# Patient Record
(demographics unavailable — no encounter records)

---

## 2025-01-21 NOTE — PHYSICAL EXAM
[Well Developed] : well developed [No Acute Distress] : no acute distress [Obese] : obese [Normal Conjunctiva] : normal conjunctiva [Normal Venous Pressure] : normal venous pressure [Carotid Bruit] : carotid bruit [Normal S1, S2] : normal S1, S2 [No Rub] : no rub [No Gallop] : no gallop [Murmur] : murmur [Clear Lung Fields] : clear lung fields [Good Air Entry] : good air entry [No Respiratory Distress] : no respiratory distress  [Soft] : abdomen soft [Non Tender] : non-tender [No Masses/organomegaly] : no masses/organomegaly [Normal Gait] : normal gait [No Edema] : no edema [No Cyanosis] : no cyanosis [No Clubbing] : no clubbing [No Rash] : no rash [No Skin Lesions] : no skin lesions [Moves all extremities] : moves all extremities [No Focal Deficits] : no focal deficits [Normal Speech] : normal speech [Alert and Oriented] : alert and oriented [Normal memory] : normal memory [Appears Anxious] : appears anxious [de-identified] : Left [de-identified] : Grade II/VI systolic murmur

## 2025-01-21 NOTE — REVIEW OF SYSTEMS
[Chest Discomfort] : chest discomfort [Palpitations] : palpitations [Depression] : depression [Anxiety] : anxiety [Under Stress] : under stress [Negative] : Heme/Lymph [FreeTextEntry5] : Occasional dizziness

## 2025-01-21 NOTE — HISTORY OF PRESENT ILLNESS
[FreeTextEntry1] : 1-week MCOT (01/07 to 01/13/2025):  Presenting rhythm was NSR with average heart rate 87 bpm (Max 120, Min 51).  There were NO arrhythmias such as AFib, VT, SVT, pauses, or AV blocks.  There were occasional PVCs with only 3% burden.  1-short burst of VT vs. SVT.    Blood work from PCP (January 9, 2025):  Free T4 low at (0.77) with normal TSH at (1.74).    Carotid Doppler (December 4, 2024):  There is moderate to severe atherosclerotic stenosis in LICA (50-69%) and mild to moderate stenosis in ARABELLA (20-49%).  The left and right vertebral arteries demonstrate antegrade flow.    Transthoracic Echocardiogram (December 4, 2024):  Abnormal septal motion consistent with LBBB pattern; LV systolic function mildly decreased with LVEF 45 to 50%.  Mild LVH.  Left atrium mildly dilated.  Trileaflet aortic valve with mild AS.  Mild to moderate AR.  Mild to moderate MR.  Trace TR.  Trace pericardial effusion noted.

## 2025-01-21 NOTE — REASON FOR VISIT
[FreeTextEntry1] : The patient is a 74-year-old woman, who happens to be the wife of another mutual patient, Vern Aldridge.  She came to our office last month for a cardiac consultation regarding an increase of intermittent "dizziness" which she describes as an imbalance-like feeling that can come on suddenly without warning.  Sometimes, she states, she gets associated fluttering in the chest.  Fortunately, there's been no associated symptoms such as diaphoresis, SOB, MALIK, orthopnea, PND, syncope, edema.  After further questioning, it appears the patient has mainly noticed these symptoms during times of high stress/anxiety, and she's also noticed a "squeezing"-type sensation in her chest during these highly stressful times as well.   Curiously, it appears the patient did not experience any of her symptoms during the 7 days of wearing the MCOT device, and she admits to feeling palpitations /dizziness less than 24 hours of taking off the monitoring device.  Otherwise, she reports noticing her symptoms daily with multiple occasions throughout the day.

## 2025-01-21 NOTE — ASSESSMENT
[FreeTextEntry1] : EKG demonstrates sinus rhythm, rate 90 bpm, left bundle branch block with left axis, left atrial enlargement pattern, nonspecific ST elevation.    In summary, 74-year-old woman, who happens to be the wife of another mutual patient, Vern Aldridge.  She came to our office last month for a cardiac consultation regarding an increase of intermittent "dizziness" which she describes as an imbalance-like feeling that can come on suddenly without warning.  Sometimes, she states, she gets associated fluttering in the chest.  Fortunately, there's been no associated symptoms such as diaphoresis, SOB, MALIK, orthopnea, PND, syncope, edema.  After further questioning, it appears the patient has mainly noticed these symptoms during times of high stress/anxiety, and she's also noticed a "squeezing"-type sensation in her chest during these highly stressful times as well.   Curiously, it appears the patient did not experience any of her symptoms during the 7 days of wearing the MCOT device, and she admits to feeling palpitations /dizziness less than 24 hours of taking off the monitoring device.  Otherwise, she reports noticing her symptoms daily with multiple occasions throughout the day.    1-week MCOT (01/07 to 01/13/2025):  Presenting rhythm was NSR with average heart rate 87 bpm (Max 120, Min 51).  There were NO arrhythmias such as AFib, VT, SVT, pauses, or AV blocks.  There were occasional PVCs with only 3% burden.  1-short burst of VT vs. SVT.    Blood work from PCP (January 9, 2025):  Free T4 low at (0.77) with normal TSH at (1.74).    Carotid Doppler (December 4, 2024):  There is moderate to severe atherosclerotic stenosis in LICA (50-69%) and mild to moderate stenosis in ARABELLA (20-49%).  The left and right vertebral arteries demonstrate antegrade flow.    Transthoracic Echocardiogram (December 4, 2024):  Abnormal septal motion consistent with LBBB pattern; LV systolic function mildly decreased with LVEF 45 to 50%.  Mild LVH.  Left atrium mildly dilated.  Trileaflet aortic valve with mild AS.  Mild to moderate AR.  Mild to moderate MR.  Trace TR.  Trace pericardial effusion noted.    PLAN:  1).  Patient reassured her cardiac testing modalities have so far been fairly unremarkable, with the exception of showing a mildly reduced LV systolic cardiac function and some at least moderate to possibly severe carotid plaquing stenosis.    Will recommend she undergo a Pharmacologic Nuclear Stress Test and a Carotid MRA test within the near future to further delineate coronary perfusion to rule out CAD and to further assess carotid perfusion to rule out severe carotid stensosis respectively.    2).  Follow up with PCP regarding routine checkups and fasting blood work including lipid panel.  Forward all testing/lab work to our office.   Discuss possibly modifying anti-anxiety meds /anti-depression meds if needed in near future.    3).  Diet and lifestyle modification discussed including low sodium, low fat and low carbohydrate weight reducing diet.  Patient is to implement aerobic exercise regimen few days per week pending results from NST.   4).  Recommend patient report any untoward symptoms.   5).  Follow up with our office within 5-6 weeks or PRN.

## 2025-01-21 NOTE — PHYSICAL EXAM
[Well Developed] : well developed [No Acute Distress] : no acute distress [Obese] : obese [Normal Conjunctiva] : normal conjunctiva [Normal Venous Pressure] : normal venous pressure [Carotid Bruit] : carotid bruit [Normal S1, S2] : normal S1, S2 [No Rub] : no rub [No Gallop] : no gallop [Murmur] : murmur [Clear Lung Fields] : clear lung fields [Good Air Entry] : good air entry [No Respiratory Distress] : no respiratory distress  [Soft] : abdomen soft [Non Tender] : non-tender [No Masses/organomegaly] : no masses/organomegaly [Normal Gait] : normal gait [No Edema] : no edema [No Cyanosis] : no cyanosis [No Clubbing] : no clubbing [No Rash] : no rash [No Skin Lesions] : no skin lesions [Moves all extremities] : moves all extremities [No Focal Deficits] : no focal deficits [Normal Speech] : normal speech [Alert and Oriented] : alert and oriented [Normal memory] : normal memory [Appears Anxious] : appears anxious [de-identified] : Left [de-identified] : Grade II/VI systolic murmur

## 2025-03-14 NOTE — PHYSICAL EXAM
[Well Developed] : well developed [No Acute Distress] : no acute distress [Obese] : obese [Normal Conjunctiva] : normal conjunctiva [Normal Venous Pressure] : normal venous pressure [Carotid Bruit] : carotid bruit [Normal S1, S2] : normal S1, S2 [No Rub] : no rub [No Gallop] : no gallop [Murmur] : murmur [Clear Lung Fields] : clear lung fields [Good Air Entry] : good air entry [No Respiratory Distress] : no respiratory distress  [Soft] : abdomen soft [Non Tender] : non-tender [No Masses/organomegaly] : no masses/organomegaly [Normal Gait] : normal gait [No Edema] : no edema [No Cyanosis] : no cyanosis [No Clubbing] : no clubbing [No Rash] : no rash [No Skin Lesions] : no skin lesions [Moves all extremities] : moves all extremities [No Focal Deficits] : no focal deficits [Normal Speech] : normal speech [Alert and Oriented] : alert and oriented [Normal memory] : normal memory [Appears Anxious] : appears anxious [de-identified] : Left [de-identified] : Grade II/VI systolic murmur

## 2025-03-14 NOTE — HISTORY OF PRESENT ILLNESS
[FreeTextEntry1] : She reports her PCP recently prescribed her Crestor 5 mg nightly after reviewing he most recent labs showing total cholesterol 290 and .  Patient has not yet started this Statin medication but will be in the near future.  Repeat labs scheduled for later in May with her PCP.    Heart rates and blood pressure once again slightly elevated;  Currently on no prescription antihypertensive meds.    Patient now believes her dizziness "lightheadedness" may be contributed to all of her anti-anxiety/anti-depression meds.   1-week MCOT (01/07 to 01/13/2025):  Presenting rhythm was NSR with average heart rate 87 bpm (Max 120, Min 51).  There were NO arrhythmias such as AFib, VT, SVT, pauses, or AV blocks.  There were occasional PVCs with only 3% burden.  1-short burst of VT vs. SVT.    Blood work from PCP (January 9, 2025):  Glucose 99, HgA1C 5.2%, Creatinine 0.97, Cholesterol 290, , HDL 52, Triglycerides 134. Free T4 low at (0.77) with normal TSH at (1.74).    Carotid Doppler (December 4, 2024):  There is moderate to severe atherosclerotic stenosis in LICA (50-69%) and mild to moderate stenosis in ARABELLA (20-49%).  The left and right vertebral arteries demonstrate antegrade flow.    Transthoracic Echocardiogram (December 4, 2024):  Abnormal septal motion consistent with LBBB pattern; LV systolic function mildly decreased with LVEF 45 to 50%.  Mild LVH.  Left atrium mildly dilated.  Trileaflet aortic valve with mild AS.  Mild to moderate AR.  Mild to moderate MR.  Trace TR.  Trace pericardial effusion noted.    MRA of carotids (January 27, 2025):  Only shows 50% stenosis bilaterally in both ARABELLA and LICA.  The vertebral arteries demonstrate antegrade flow.

## 2025-03-14 NOTE — REASON FOR VISIT
[FreeTextEntry1] : The patient is a 74-year-old woman, who happens to be the wife of another mutual patient, Vern Aldridge.  She came to our office last December for a cardiac consultation regarding an increase of intermittent "dizziness" which she describes as an imbalance-like feeling that can come on suddenly without warning, and usually occurs while at rest and without change in position.  Sometimes, she states, she gets associated fluttering in the chest.  Fortunately, there's been no associated symptoms such as diaphoresis, SOB, MALIK, orthopnea, PND, syncope, edema.  After further discussion, she has not been hydrating as much as she thought she'd been hydrating.  She normally drinks a couple small bottles of water daily and she thought that was enough.  Still smoking cigarettes daily. It appears the patient has mainly noticed these symptoms during times of high stress/anxiety, and she's also noticed a "squeezing"-type sensation in her chest during these highly stressful times as well.  We recommended she undergo an ischemic workup considering her concerning symptoms along with having multiple risk factors for CAD.  However, patient was unable to stop smoking cigarettes 24 hours prior to NST, so, her ischemic workup has thus far NOT been done.     Curiously, it appears the patient did not experience any of her symptoms during the 7 days of wearing the MCOT device, and she admits to feeling palpitations /dizziness less than 24 hours of taking off the monitoring device.  Otherwise, she reports noticing her symptoms daily with multiple occasions throughout the day.

## 2025-03-14 NOTE — ASSESSMENT
[FreeTextEntry1] : EKG demonstrates sinus rhythm, rate 86 bpm, left bundle branch block with left axis, left atrial enlargement pattern, nonspecific ST elevation.    In summary, 74-year-old woman, who happens to be the wife of another mutual patient, Vern Aldridge.  She came to our office last month for a cardiac consultation regarding an increase of intermittent "dizziness" which she describes as an imbalance-like feeling that can come on suddenly without warning.  Sometimes, she states, she gets associated fluttering in the chest.  Fortunately, there's been no associated symptoms such as diaphoresis, SOB, MALIK, orthopnea, PND, syncope, edema.  After further discussion, she has not been hydrating as much as she thought she'd been hydrating.  She normally drinks a couple small bottles of water daily and she thought that was enough.  Still smoking cigarettes daily. It appears the patient has mainly noticed these symptoms during times of high stress/anxiety, and she's also noticed a "squeezing"-type sensation in her chest during these highly stressful times as well.  We recommended she undergo an ischemic workup considering her concerning symptoms along with having multiple risk factors for CAD.  However, patient was unable to stop smoking cigarettes 24 hours prior to NST, so, her ischemic workup has thus far NOT been done.     Curiously, it appears the patient did not experience any of her symptoms during the 7 days of wearing the MCOT device, and she admits to feeling palpitations /dizziness less than 24 hours of taking off the monitoring device.  Otherwise, she reports noticing her symptoms daily with multiple occasions throughout the day.    She reports her PCP recently prescribed her Crestor 5 mg nightly after reviewing he most recent labs showing total cholesterol 290 and .  Patient has not yet started this Statin medication but will be in the near future.  Repeat labs scheduled for later in May with her PCP.    Heart rates and blood pressure once again slightly elevated;  Currently on no prescription antihypertensive meds.    Patient now believes her dizziness "lightheadedness" may be contributed to all of her anti-anxiety/anti-depression meds.  1-week MCOT (01/07 to 01/13/2025):  Presenting rhythm was NSR with average heart rate 87 bpm (Max 120, Min 51).  There were NO arrhythmias such as AFib, VT, SVT, pauses, or AV blocks.  There were occasional PVCs with only 3% burden.  1-short burst of VT vs. SVT.    Blood work from PCP (January 9, 2025):  Free T4 low at (0.77) with normal TSH at (1.74).    Carotid Doppler (December 4, 2024):  There is moderate to severe atherosclerotic stenosis in LICA (50-69%) and mild to moderate stenosis in ARABELLA (20-49%).  The left and right vertebral arteries demonstrate antegrade flow.    MRA of carotids (January 27, 2025):  Only shows 50% stenosis bilaterally in both ARABELLA and LICA.  The vertebral arteries demonstrate antegrade flow.    Transthoracic Echocardiogram (December 4, 2024):  Abnormal septal motion consistent with LBBB pattern; LV systolic function mildly decreased with LVEF 45 to 50%.  Mild LVH.  Left atrium mildly dilated.  Trileaflet aortic valve with mild AS.  Mild to moderate AR.  Mild to moderate MR.  Trace TR.  Trace pericardial effusion noted.    PLAN:  1).  Considering patient's most recent echocardiogram showing a decrease in LV systolic function (LVEF 45 to 50%), with a LBBB pattern on EKG, with multiple risk factors including current everyday smoker and moderate/severe peripheral vascular disease, we recommend she undergo a cardiac CTA to further assess coronary perfusion to rule out obstructive CAD.    Begin taking OTC aspirin 81 mg daily.    We will empirically start patient on a beta blocker due to slightly elevated blood pressures and heart rates that appear to average in the high 80s to low 90s bpm.    Agree with starting Crestor 5 mg nightly recently prescribed by patients PCP.  Repeat fasting labs in approximately 3 months.    Counseled patient on the importance of smoking cessation.   2).  Follow up with PCP regarding routine checkups and fasting blood work including lipid panel.  Forward all testing/lab work to our office.   Discuss possibly modifying anti-anxiety meds /anti-depression meds if needed in near future.    3).  Diet and lifestyle modification discussed including low sodium, low fat and low carbohydrate weight reducing diet.  Patient is to implement aerobic exercise regimen few days per week pending results from cardiac CTA.    4).  Recommend patient report any untoward symptoms.   5).  Follow up with our office within 4 to 6 weeks or PRN.

## 2025-05-02 NOTE — PHYSICAL EXAM
[No Acute Distress] : no acute distress [Normal Conjunctiva] : normal conjunctiva [Normal S1, S2] : normal S1, S2 [Clear Lung Fields] : clear lung fields [Good Air Entry] : good air entry [No Respiratory Distress] : no respiratory distress  [Soft] : abdomen soft [Normal Gait] : normal gait [No Edema] : no edema [Moves all extremities] : moves all extremities [Alert and Oriented] : alert and oriented

## 2025-05-08 NOTE — HISTORY OF PRESENT ILLNESS
[FreeTextEntry1] : 75 yo female with a PMHx significant for HTN, HLD, Anxiety, b/l carotid atherosclerosis, LBBB and active tobacco user who presents to the office for the first time. She follows Dr. Rondon for Cardiology. She was in [facility] last 4/24-25 for scheduled cardiac catheterization after recent CCTA revealed RCA with focal noncalcified plaque resulting in severe luminal narrowing. He's s/p PCI with 1 CHRIS to mRCA (MANOD FRONTIER 3.5x26mm) for 80% stenosis last 4/24/2025 via RRA with Dr. Wright.  Today, he reports of feeling good. She has not smoked cigarettes since the PCI. RRA access site is benign: nontender, no lump, with palpable radial pulse distal to the access site. Denies chest pain, shortness of breath, palpitations, syncope or near syncope, orthopnea and PND. Compliant with medications.

## 2025-05-08 NOTE — CARDIOLOGY SUMMARY
[de-identified] : 5/2/2025: Sinus Rhythm  -Left bundle branch block and left axis. -Left atrial enlargement.

## 2025-05-08 NOTE — HISTORY OF PRESENT ILLNESS
[FreeTextEntry1] : 73 yo female with a PMHx significant for HTN, HLD, Anxiety, b/l carotid atherosclerosis, LBBB and active tobacco user who presents to the office for the first time. She follows Dr. Rondon for Cardiology. She was in [facility] last 4/24-25 for scheduled cardiac catheterization after recent CCTA revealed RCA with focal noncalcified plaque resulting in severe luminal narrowing. He's s/p PCI with 1 CHRIS to mRCA (MANDO FRONTIER 3.5x26mm) for 80% stenosis last 4/24/2025 via RRA with Dr. Wright.  Today, he reports of feeling good. She has not smoked cigarettes since the PCI. RRA access site is benign: nontender, no lump, with palpable radial pulse distal to the access site. Denies chest pain, shortness of breath, palpitations, syncope or near syncope, orthopnea and PND. Compliant with medications.

## 2025-05-08 NOTE — CARDIOLOGY SUMMARY
[de-identified] : 5/2/2025: Sinus Rhythm  -Left bundle branch block and left axis. -Left atrial enlargement.

## 2025-05-08 NOTE — DISCUSSION/SUMMARY
[Patient] : the patient [Risks] : risks [Benefits] : benefits [Alternatives] : alternatives [FreeTextEntry1] : 75 yo female with a PMHx significant for HTN, HLD, Anxiety, b/l carotid atherosclerosis, LBBB and active tobacco user who presents to the office for the first time. She follows Dr. Rondon for Cardiology. She was in [facility] last 4/24-25 for scheduled cardiac catheterization after recent CCTA revealed RCA with focal noncalcified plaque resulting in severe luminal narrowing. He's s/p PCI with 1 CHRIS to mRCA (MANDO FRONTIER 3.5x26mm) for 80% stenosis last 4/24/2025 via RRA with Dr. Wright.  Today, he reports of feeling good. She has not smoked cigarettes since the PCI. RRA access site is benign: nontender, no lump, with palpable radial pulse distal to the access site. Denies chest pain, shortness of breath, palpitations, syncope or near syncope, orthopnea and PND. Compliant with medications.   A/P:  #CAD s/p PCI with 1 CHRIS to mRCA (MANDO FRONTIER 3.5x26mm) for 80% stenosis last 4/24/2025 via RRA with Dr. Wright. - RRA access site is benign - denies chest pain - continue with aspirin 81 mg QD, clopidogrel 75 mg QD, rosuvastatin 10 mg Q HS and Toprol 50 mg QD  #HTN - stable - continue with amlodipine 5 mg QD, Toprol 50 mg QD  #HLD  - continue with rosuvastatin 10 mg QH until follow up visit with Dr. Rondon on 6/5/205 - LDL goal <70  f/u with Dr. Rondon as scheduled or sooner if needed   Patient was advised to contact the office or seek emergency medical care for any new, worsening or concerning symptoms. Patient verbalized understanding and is in agreement with the above plan.  Jimmy Restrepo, EZ [EKG obtained to assist in diagnosis and management of assessed problem(s)] : EKG obtained to assist in diagnosis and management of assessed problem(s)

## 2025-05-08 NOTE — DISCUSSION/SUMMARY
[Patient] : the patient [Risks] : risks [Benefits] : benefits [Alternatives] : alternatives [FreeTextEntry1] : 75 yo female with a PMHx significant for HTN, HLD, Anxiety, b/l carotid atherosclerosis, LBBB and active tobacco user who presents to the office for the first time. She follows Dr. Rondon for Cardiology. She was in [facility] last 4/24-25 for scheduled cardiac catheterization after recent CCTA revealed RCA with focal noncalcified plaque resulting in severe luminal narrowing. He's s/p PCI with 1 CHRIS to mRCA (MANDO FRONTIER 3.5x26mm) for 80% stenosis last 4/24/2025 via RRA with Dr. Wright.  Today, he reports of feeling good. She has not smoked cigarettes since the PCI. RRA access site is benign: nontender, no lump, with palpable radial pulse distal to the access site. Denies chest pain, shortness of breath, palpitations, syncope or near syncope, orthopnea and PND. Compliant with medications.   A/P:  #CAD s/p PCI with 1 CHRIS to mRCA (MANDO FRONTIER 3.5x26mm) for 80% stenosis last 4/24/2025 via RRA with Dr. Wright. - RRA access site is benign - denies chest pain - continue with aspirin 81 mg QD, clopidogrel 75 mg QD, rosuvastatin 10 mg Q HS and Toprol 50 mg QD  #HTN - stable - continue with amlodipine 5 mg QD, Toprol 50 mg QD  #HLD  - continue with rosuvastatin 10 mg QH until follow up visit with Dr. Rondon on 6/5/205 - LDL goal <70  f/u with Dr. Rondon as scheduled or sooner if needed   Patient was advised to contact the office or seek emergency medical care for any new, worsening or concerning symptoms. Patient verbalized understanding and is in agreement with the above plan.  Jimmy Restrepo, EZ [EKG obtained to assist in diagnosis and management of assessed problem(s)] : EKG obtained to assist in diagnosis and management of assessed problem(s)